# Patient Record
Sex: FEMALE | Race: BLACK OR AFRICAN AMERICAN | NOT HISPANIC OR LATINO | URBAN - METROPOLITAN AREA
[De-identification: names, ages, dates, MRNs, and addresses within clinical notes are randomized per-mention and may not be internally consistent; named-entity substitution may affect disease eponyms.]

---

## 2017-04-13 ENCOUNTER — OFFICE VISIT - HEALTHEAST (OUTPATIENT)
Dept: FAMILY MEDICINE | Facility: CLINIC | Age: 27
End: 2017-04-13

## 2017-04-13 ENCOUNTER — HOSPITAL ENCOUNTER (OUTPATIENT)
Dept: ULTRASOUND IMAGING | Facility: HOSPITAL | Age: 27
Discharge: HOME | End: 2017-04-13

## 2017-04-13 DIAGNOSIS — N93.9 VAGINAL BLEEDING: ICD-10-CM

## 2017-04-14 ENCOUNTER — COMMUNICATION - HEALTHEAST (OUTPATIENT)
Dept: SCHEDULING | Facility: CLINIC | Age: 27
End: 2017-04-14

## 2017-11-24 ENCOUNTER — RECORDS - HEALTHEAST (OUTPATIENT)
Dept: ADMINISTRATIVE | Facility: OTHER | Age: 27
End: 2017-11-24

## 2021-05-30 VITALS — WEIGHT: 153 LBS | BODY MASS INDEX: 27.98 KG/M2

## 2021-05-31 VITALS — BODY MASS INDEX: 26.52 KG/M2 | WEIGHT: 145 LBS

## 2021-06-10 NOTE — PROGRESS NOTES
"Subjective:      Patient ID: Fanny Handy is a 26 y.o. female.    Chief Complaint:    HPI Fanny Handy is a 26 y.o. female who presents today complaining of excessive vaginal bleeding x 1 day.  She reports that today she had abdominal pain and cramping, then she went to the bathroom and had excessive vaginal bleeding that filled the bowl and had clots in it.  She says that she was currently menstruating and having a normal period that started 1 week ago, but then her period suddenly got much heavier today.  She is also feeling lightheaded. She states that the bleeding  has slowed now, but \"it was like a faucet for a half an hour.\" Patient is sexually active, she sometimes uses condoms but not all the time and she is not currently on any birth control. She feels mildly short of breath. Even though the bleeding has slowed down she is still having abdominal cramping which is an 8.5/10 it was a 10 early. She hasn't taken any mediation, all she had done was drink a lot of water because she didn't know what else to do. Her period started one week ago. Patient was pregnant and had had a miscarrage 5 months ago and her period has been irregular ever since, she sometimes goes months without having any period at all. She historically has heavy and long periods, but she has never had bleeding this severe.       Social History   Substance Use Topics     Smoking status: Never Smoker     Smokeless tobacco: None     Alcohol use None       Review of Systems   Constitutional: Negative for fever.   Gastrointestinal: Positive for abdominal pain. Negative for constipation, diarrhea, nausea and vomiting.   Genitourinary: Positive for pelvic pain and vaginal bleeding. Negative for dyspareunia, dysuria and flank pain.   Musculoskeletal: Positive for back pain.       Objective:     BP 90/60  Pulse (!) 51  Temp 98.6  F (37  C) (Oral)   Resp 16  Wt 153 lb (69.4 kg)  LMP 04/11/2017  SpO2 100%  Breastfeeding? No  BMI 27.98 " kg/m2    Physical Exam   Constitutional: She appears well-developed and well-nourished. No distress.   HENT:   Head: Normocephalic and atraumatic.   Eyes: Conjunctivae are normal. No scleral icterus.   Pulmonary/Chest: Effort normal and breath sounds normal. No respiratory distress.   Abdominal: Soft. Bowel sounds are normal. There is no hepatosplenomegaly. There is tenderness in the suprapubic area. There is no rigidity, no rebound, no guarding, no CVA tenderness, no tenderness at McBurney's point and negative Landin's sign.   Midline abdominal pain location worse close to the pelvis.    Genitourinary: There is no rash on the right labia. There is no rash on the left labia. Uterus is not enlarged. Cervix exhibits discharge. Cervix exhibits no motion tenderness and no friability. Right adnexum displays no mass, no tenderness and no fullness. Left adnexum displays no mass, no tenderness and no fullness. There is bleeding in the vagina. No erythema or tenderness in the vagina. No foreign body in the vagina. No signs of injury around the vagina. No vaginal discharge found.   Genitourinary Comments: Bloody discharge noted in the vaginal vaults and clots present coming from the cervix. The cervical os is closed.      Recent Results (from the past 24 hour(s))   Pregnancy (Beta-hCG, Qual), Urine   Result Value Ref Range    Pregnancy Test, Urine Negative Negative    Specific Gravity, UA 1.010 1.001 - 1.030   HM1 (CBC with Diff)   Result Value Ref Range    WBC 6.3 4.0 - 11.0 thou/uL    RBC 4.50 3.80 - 5.40 mill/uL    Hemoglobin 13.0 12.0 - 16.0 g/dL    Hematocrit 39.1 35.0 - 47.0 %    MCV 87 80 - 100 fL    MCH 28.8 27.0 - 34.0 pg    MCHC 33.1 32.0 - 36.0 g/dL    RDW 13.0 11.0 - 14.5 %    Platelets 347 140 - 440 thou/uL    MPV 6.8 (L) 7.0 - 10.0 fL    Neutrophils % 50 50 - 70 %    Lymphocytes % 42 (H) 20 - 40 %    Monocytes % 6 2 - 10 %    Eosinophils % 1 0 - 6 %    Basophils % 1 0 - 2 %    Neutrophils Absolute 3.1 2.0 - 7.7  thou/uL    Lymphocytes Absolute 2.7 0.8 - 4.4 thou/uL    Monocytes Absolute 0.4 0.0 - 0.9 thou/uL    Eosinophils Absolute 0.1 0.0 - 0.4 thou/uL    Basophils Absolute 0.1 0.0 - 0.2 thou/uL     Us Pelvis With Transvaginal Non Ob    Result Date: 4/13/2017  US PELVIS WITH TRANSVAGINAL NON OB 4/13/2017 5:34 PM INDICATION: Excessive Vaginal Bleeding. Negative urine pregnancy test today. History of miscarriage 5 months ago. TECHNIQUE: Transabdominal scans were performed. Endovaginal ultrasound was performed to better visualize the adnexa. COMPARISON: None. FINDINGS: Uterus measures 9.5 x 5.4 x 5.2 cm. The uterus appears normal. However the uterine cavity is abnormal containing a large volume of heterogeneous material most suggestive of blood products. The endometrial cavity measures 3 cm diameter. None of the intrauterine cavity material contains blood flow. Right ovary is obscured by gas. Left ovary measures 2.3 x 2 x 1.5 cm. Normal left ovary. Normal arterial duplex. No significant free fluid in the cul-de-sac.     CONCLUSION: 1.  Abnormal uterine cavity containing large volume heterogeneous material most likely blood. 2.  Possible etiologies are miscarriage, or retained products of conception with bleeding. 3.  Report called to the patient's on-call healthcare provider.    Procedures     On physical exam, she had tenderness in the pelvic area on palpation.  Her pelvic ultrasound showed heterogeneous material in the uterine cavity, the radiologist suggested miscarriage or retained products of conception, however she had a negative urine skin pregnancy test and I think that is unlikely that her products of conception could still be present 5 months after a miscarriage.  I consulted the ER physician, and he agreed with this evaluation.  The patient was prescribed Provera 10 mg ×5 days to help with the bleeding.  She was also put on urgent referral for gynecology, and she will be called tomorrow morning to set up that  appointment.  The patient was instructed to use ibuprofen 600 mg 3 times daily for her pain.     At the end of the encounter, I discussed results, diagnosis, medications. Discussed red flags for immediate return to clinic/ER, as well as indications for follow up if no improvement. Patient understood and agreed to plan. Patient was stable for discharge.      Assessment / Plan:     1. Vaginal bleeding  Pregnancy (Beta-hCG, Qual), Urine    HM1(CBC and Differential)    HM1 (CBC with Diff)    medroxyPROGESTERone (PROVERA) 10 MG tablet    Ambulatory referral to Gynecology    US Pelvis With Transvaginal Non OB    CANCELED: US Abdomen Limited    CANCELED: US Pelvis Non OB    CANCELED: Beta-hCG, Quantitative         Patient Instructions   1) Your blood test showed that you are not anemic.   2) Start taking Provera 1 pill daily for a total of 5 days.  3) You will be notified of ultrasound results shortly after it is completed.  4) You will be called to set up an appointment with gynecology within the next 2 days.  5) if you continue having bleeding or have worsening bleeding after the oral contraception is completed follow-up at walk-in care.  If you develop shortness of breath, severe fatigue, or severe lightheadedness, follow-up sooner or signs of anemia.

## 2021-07-03 NOTE — ADDENDUM NOTE
Addendum Note by Andrea Patel PA-C at 4/13/2017  6:45 PM     Author: Andrea Patel PA-C Service: -- Author Type: Physician Assistant    Filed: 4/13/2017  6:45 PM Encounter Date: 4/13/2017 Status: Signed    : Andrea Patel PA-C (Physician Assistant)    Addended by: ANDREA PATEL on: 4/13/2017 06:45 PM        Modules accepted: Orders